# Patient Record
Sex: FEMALE | Race: WHITE | NOT HISPANIC OR LATINO | Employment: PART TIME | URBAN - NONMETROPOLITAN AREA
[De-identification: names, ages, dates, MRNs, and addresses within clinical notes are randomized per-mention and may not be internally consistent; named-entity substitution may affect disease eponyms.]

---

## 2017-06-29 ENCOUNTER — APPOINTMENT (OUTPATIENT)
Dept: GENERAL RADIOLOGY | Facility: HOSPITAL | Age: 22
End: 2017-06-29

## 2017-06-29 ENCOUNTER — HOSPITAL ENCOUNTER (EMERGENCY)
Facility: HOSPITAL | Age: 22
Discharge: HOME | End: 2017-06-29
Attending: EMERGENCY MEDICINE | Admitting: EMERGENCY MEDICINE

## 2017-06-29 VITALS
RESPIRATION RATE: 18 BRPM | SYSTOLIC BLOOD PRESSURE: 140 MMHG | HEART RATE: 87 BPM | HEIGHT: 66 IN | BODY MASS INDEX: 22.5 KG/M2 | WEIGHT: 140 LBS | TEMPERATURE: 98 F | DIASTOLIC BLOOD PRESSURE: 97 MMHG | OXYGEN SATURATION: 100 %

## 2017-06-29 DIAGNOSIS — R00.2 HEART PALPITATIONS: Primary | ICD-10-CM

## 2017-06-29 DIAGNOSIS — E16.2 HYPOGLYCEMIA: ICD-10-CM

## 2017-06-29 DIAGNOSIS — F45.8 HYPERVENTILATION SYNDROME: ICD-10-CM

## 2017-06-29 DIAGNOSIS — R42 LIGHTHEADEDNESS: ICD-10-CM

## 2017-06-29 LAB
AMPHET+METHAMPHET UR QL: NEGATIVE
AMPHETAMINES UR QL: NEGATIVE
ANION GAP SERPL CALCULATED.3IONS-SCNC: 15.9 MMOL/L
B-HCG UR QL: NEGATIVE
BACTERIA UR QL AUTO: NORMAL /HPF
BARBITURATES UR QL SCN: NEGATIVE
BASOPHILS # BLD AUTO: 0.06 10*3/MM3 (ref 0–0.2)
BASOPHILS NFR BLD AUTO: 1.2 % (ref 0–2.5)
BENZODIAZ UR QL SCN: NEGATIVE
BILIRUB UR QL STRIP: NEGATIVE
BUN BLD-MCNC: 13 MG/DL (ref 7–20)
BUN/CREAT SERPL: 11.8 (ref 7.1–23.5)
BUPRENORPHINE SERPL-MCNC: NEGATIVE NG/ML
CALCIUM SPEC-SCNC: 9.6 MG/DL (ref 8.4–10.2)
CANNABINOIDS SERPL QL: NEGATIVE
CHLORIDE SERPL-SCNC: 107 MMOL/L (ref 98–107)
CLARITY UR: CLEAR
CO2 SERPL-SCNC: 24 MMOL/L (ref 26–30)
COCAINE UR QL: NEGATIVE
COLOR UR: YELLOW
CREAT BLD-MCNC: 1.1 MG/DL (ref 0.6–1.3)
DEPRECATED RDW RBC AUTO: 42.1 FL (ref 37–54)
EOSINOPHIL # BLD AUTO: 0.22 10*3/MM3 (ref 0–0.7)
EOSINOPHIL NFR BLD AUTO: 4.3 % (ref 0–7)
ERYTHROCYTE [DISTWIDTH] IN BLOOD BY AUTOMATED COUNT: 12.5 % (ref 11.5–14.5)
GFR SERPL CREATININE-BSD FRML MDRD: 63 ML/MIN/1.73
GLUCOSE BLD-MCNC: 65 MG/DL (ref 74–98)
GLUCOSE BLDC GLUCOMTR-MCNC: 103 MG/DL (ref 70–130)
GLUCOSE UR STRIP-MCNC: NEGATIVE MG/DL
HCT VFR BLD AUTO: 45 % (ref 37–47)
HGB BLD-MCNC: 14.7 G/DL (ref 12–16)
HGB UR QL STRIP.AUTO: ABNORMAL
HYALINE CASTS UR QL AUTO: NORMAL /LPF
IMM GRANULOCYTES # BLD: 0.01 10*3/MM3 (ref 0–0.06)
IMM GRANULOCYTES NFR BLD: 0.2 % (ref 0–0.6)
KETONES UR QL STRIP: NEGATIVE
LEUKOCYTE ESTERASE UR QL STRIP.AUTO: NEGATIVE
LYMPHOCYTES # BLD AUTO: 1.45 10*3/MM3 (ref 0.6–3.4)
LYMPHOCYTES NFR BLD AUTO: 28.3 % (ref 10–50)
MCH RBC QN AUTO: 30 PG (ref 27–31)
MCHC RBC AUTO-ENTMCNC: 32.7 G/DL (ref 30–37)
MCV RBC AUTO: 91.8 FL (ref 81–99)
METHADONE UR QL SCN: NEGATIVE
MONOCYTES # BLD AUTO: 0.3 10*3/MM3 (ref 0–0.9)
MONOCYTES NFR BLD AUTO: 5.8 % (ref 0–12)
NEUTROPHILS # BLD AUTO: 3.09 10*3/MM3 (ref 2–6.9)
NEUTROPHILS NFR BLD AUTO: 60.2 % (ref 37–80)
NITRITE UR QL STRIP: NEGATIVE
NRBC BLD MANUAL-RTO: 0 /100 WBC (ref 0–0)
OPIATES UR QL: NEGATIVE
OXYCODONE UR QL SCN: NEGATIVE
PCP UR QL SCN: NEGATIVE
PH UR STRIP.AUTO: 5.5 [PH] (ref 5–8)
PLATELET # BLD AUTO: 293 10*3/MM3 (ref 130–400)
PMV BLD AUTO: 9.5 FL (ref 6–12)
POTASSIUM BLD-SCNC: 3.9 MMOL/L (ref 3.5–5.1)
PROPOXYPH UR QL: NEGATIVE
PROT UR QL STRIP: NEGATIVE
RBC # BLD AUTO: 4.9 10*6/MM3 (ref 4.2–5.4)
RBC # UR: NORMAL /HPF
REF LAB TEST METHOD: NORMAL
SODIUM BLD-SCNC: 143 MMOL/L (ref 137–145)
SP GR UR STRIP: 1.01 (ref 1–1.03)
SQUAMOUS #/AREA URNS HPF: NORMAL /HPF
TRICYCLICS UR QL SCN: NEGATIVE
UROBILINOGEN UR QL STRIP: ABNORMAL
WBC NRBC COR # BLD: 5.13 10*3/MM3 (ref 4.8–10.8)
WBC UR QL AUTO: NORMAL /HPF

## 2017-06-29 PROCEDURE — 80306 DRUG TEST PRSMV INSTRMNT: CPT | Performed by: PHYSICIAN ASSISTANT

## 2017-06-29 PROCEDURE — 80048 BASIC METABOLIC PNL TOTAL CA: CPT | Performed by: PHYSICIAN ASSISTANT

## 2017-06-29 PROCEDURE — 81001 URINALYSIS AUTO W/SCOPE: CPT | Performed by: PHYSICIAN ASSISTANT

## 2017-06-29 PROCEDURE — 80177 DRUG SCRN QUAN LEVETIRACETAM: CPT | Performed by: PHYSICIAN ASSISTANT

## 2017-06-29 PROCEDURE — 81025 URINE PREGNANCY TEST: CPT | Performed by: PHYSICIAN ASSISTANT

## 2017-06-29 PROCEDURE — 93005 ELECTROCARDIOGRAM TRACING: CPT | Performed by: PHYSICIAN ASSISTANT

## 2017-06-29 PROCEDURE — 99283 EMERGENCY DEPT VISIT LOW MDM: CPT

## 2017-06-29 PROCEDURE — 85025 COMPLETE CBC W/AUTO DIFF WBC: CPT | Performed by: PHYSICIAN ASSISTANT

## 2017-06-29 PROCEDURE — 82962 GLUCOSE BLOOD TEST: CPT

## 2017-06-29 PROCEDURE — 71010 HC CHEST PA OR AP: CPT

## 2017-06-29 RX ORDER — HYDROXYZINE PAMOATE 50 MG/1
50 CAPSULE ORAL ONCE
Status: DISCONTINUED | OUTPATIENT
Start: 2017-06-29 | End: 2017-06-29

## 2017-06-29 RX ORDER — BUSPIRONE HYDROCHLORIDE 5 MG/1
5 TABLET ORAL 2 TIMES DAILY PRN
Qty: 14 TABLET | Refills: 0 | Status: SHIPPED | OUTPATIENT
Start: 2017-06-29 | End: 2017-07-18

## 2017-06-29 RX ORDER — LORAZEPAM 0.5 MG/1
0.5 TABLET ORAL ONCE
Status: COMPLETED | OUTPATIENT
Start: 2017-06-29 | End: 2017-06-29

## 2017-06-29 RX ADMIN — LORAZEPAM 0.5 MG: 0.5 TABLET ORAL at 16:22

## 2017-07-03 LAB — LEVETIRACETAM SERPL-MCNC: 1.6 UG/ML (ref 10–40)

## 2017-07-18 ENCOUNTER — OFFICE VISIT (OUTPATIENT)
Dept: INTERNAL MEDICINE | Facility: CLINIC | Age: 22
End: 2017-07-18

## 2017-07-18 VITALS
DIASTOLIC BLOOD PRESSURE: 62 MMHG | BODY MASS INDEX: 22.02 KG/M2 | SYSTOLIC BLOOD PRESSURE: 108 MMHG | TEMPERATURE: 98.1 F | WEIGHT: 137 LBS | OXYGEN SATURATION: 97 % | HEART RATE: 54 BPM | HEIGHT: 66 IN

## 2017-07-18 DIAGNOSIS — G40.A09 NONINTRACTABLE ABSENCE EPILEPSY WITHOUT STATUS EPILEPTICUS (HCC): ICD-10-CM

## 2017-07-18 DIAGNOSIS — R00.2 PALPITATIONS: ICD-10-CM

## 2017-07-18 DIAGNOSIS — R63.4 UNEXPLAINED WEIGHT LOSS: ICD-10-CM

## 2017-07-18 DIAGNOSIS — R53.83 FATIGUE, UNSPECIFIED TYPE: ICD-10-CM

## 2017-07-18 DIAGNOSIS — F41.9 ANXIETY: ICD-10-CM

## 2017-07-18 DIAGNOSIS — E16.2 HYPOGLYCEMIA: Primary | ICD-10-CM

## 2017-07-18 PROCEDURE — 99204 OFFICE O/P NEW MOD 45 MIN: CPT | Performed by: FAMILY MEDICINE

## 2017-07-18 RX ORDER — BLOOD-GLUCOSE METER
1 KIT MISCELLANEOUS DAILY
Qty: 100 EACH | Refills: 12 | OUTPATIENT
Start: 2017-07-18 | End: 2020-08-13

## 2017-07-18 RX ORDER — BUSPIRONE HYDROCHLORIDE 5 MG/1
5 TABLET ORAL 2 TIMES DAILY PRN
Qty: 60 TABLET | Refills: 5 | Status: SHIPPED | OUTPATIENT
Start: 2017-07-18 | End: 2018-03-20

## 2017-07-18 RX ORDER — BLOOD-GLUCOSE METER
1 KIT MISCELLANEOUS AS NEEDED
Qty: 30 EACH | Refills: 0 | Status: SHIPPED | OUTPATIENT
Start: 2017-07-18

## 2017-07-18 RX ORDER — NORGESTIMATE AND ETHINYL ESTRADIOL 7DAYSX3 28
1 KIT ORAL DAILY
COMMUNITY
End: 2020-08-13

## 2017-07-18 NOTE — PROGRESS NOTES
"Subjective    Tali Benítez is a 21 y.o. female here for:  Chief Complaint   Patient presents with   • Establish Care     ESTABLISH CARE WITH DR LANZA   • Palpitations     ER F/U [facility] D/C 06/29; PALPITATIONS, LOW BLOOD SUGAR, AND ANXIETY     History of Present Illness   Patient woke up one morning and felt okay, says she was not stressed (admits to getting stressed easily, though.) She got up and got in the shower and hands, feet, face felt numb, buzzing. She figured it would pass. She went to eat at [employer] and had a lot of food with water (which is what she normally drinks.) She continued to feel worse, felt like talking was difficult. All of a sudden she felt a bit panicked. She finished eating then told her boyfriend her whole body felt like it was buzzing. In the car leaving she texted mom who asked if she was having an allergic reaction. She then had trouble breathing. In the ER she had labs that showed a low glucose level despite eating right before. She had another episode a week or so later where she was dizzy, drank some sweet tea then she felt better. Tends to eat healthy in general, exercises daily. Mom also has issues with sugar dropping. She does not feel she's losing weight but she does feel tired a lot. She sometimes has a tremor to hands at work. No medicine changes except for the birth control but that was after the ER episode. She sees a neurologist in [place], has not had a seizure in a long time. She admits she has a lot of issues with anxiety, mother has suggested medicine but she's not sure she wants to start another daily medicine. The ER gave her two weeks worth of Buspar, it did seem to help her when she took it. She was told to avoid benadryl, as she has \"an opposite reaction\". She says ER was going to prescribe something else like benadryl (likely hydroxyzine) but then changed to Buspar. She still gets palpitations at times when she's really anxious. She also notes at times fingers " have less color, she was told she may have raynauds. She does not smoke. She admits she does not eat certain carbs as she does not feel well after eating them but she's had her gallbladder out as well. She does not know of a personal or family history of celiac. Sister may have thyroid issues, mom does not.    The following portions of the patient's history were reviewed and updated as appropriate: allergies, current medications, past family history, past medical history, past social history, past surgical history and problem list.    Review of Systems   Constitutional: Positive for fatigue. Negative for unexpected weight change.   Respiratory: Positive for shortness of breath.    Cardiovascular: Positive for chest pain.   Gastrointestinal: Positive for abdominal pain. Negative for constipation and diarrhea.   Genitourinary: Positive for menstrual problem (abnormal vaginal bleeding, saw gynecology 7/13/17 and had pap) and pelvic pain.   Neurological: Positive for weakness.   Psychiatric/Behavioral: Positive for sleep disturbance. The patient is nervous/anxious.    All other systems reviewed and are negative.      Vitals:    07/18/17 1543   BP: 108/62   Pulse: 54   Temp: 98.1 °F (36.7 °C)   SpO2: 97%       Objective   Physical Exam   Constitutional: She is oriented to person, place, and time. Vital signs are normal. She appears well-developed and well-nourished. She is active.   HENT:   Head: Normocephalic and atraumatic. Hair is normal.   Right Ear: Hearing normal.   Left Ear: Hearing normal.   Nose: Nose normal.   Eyes: EOM and lids are normal. Pupils are equal, round, and reactive to light. No scleral icterus.   Neck: Phonation normal. Neck supple. No thyroid mass and no thyromegaly present.   Cardiovascular: Normal rate, regular rhythm and normal heart sounds.    Pulses:       Radial pulses are 2+ on the right side, and 2+ on the left side.   Pulmonary/Chest: Effort normal and breath sounds normal.   Abdominal:  Soft. Bowel sounds are normal. She exhibits no mass. There is tenderness (mild) in the suprapubic area. There is no rigidity, no rebound and no guarding.   Musculoskeletal: She exhibits no deformity.        Right hand: She exhibits no deformity.        Left hand: She exhibits no deformity.   Neurological: She is alert and oriented to person, place, and time. She has normal strength. She displays no tremor. No cranial nerve deficit. Gait normal.   Skin: Skin is warm. She is not diaphoretic. No cyanosis. No pallor. Nails show no clubbing.   Psychiatric: She has a normal mood and affect. Her behavior is normal. Judgment and thought content normal.   Nursing note and vitals reviewed.      Admission on 06/29/2017, Discharged on 06/29/2017   Component Date Value Ref Range Status   • Glucose 06/29/2017 65* 74 - 98 mg/dL Final   • BUN 06/29/2017 13  7 - 20 mg/dL Final   • Creatinine 06/29/2017 1.10  0.60 - 1.30 mg/dL Final   • Sodium 06/29/2017 143  137 - 145 mmol/L Final   • Potassium 06/29/2017 3.9  3.5 - 5.1 mmol/L Final   • Chloride 06/29/2017 107  98 - 107 mmol/L Final   • CO2 06/29/2017 24.0* 26.0 - 30.0 mmol/L Final   • Calcium 06/29/2017 9.6  8.4 - 10.2 mg/dL Final   • eGFR Non African Amer 06/29/2017 63  >60 mL/min/1.73 Final   • BUN/Creatinine Ratio 06/29/2017 11.8  7.1 - 23.5 Final   • Anion Gap 06/29/2017 15.9  mmol/L Final   • HCG, Urine QL 06/29/2017 Negative  Negative Final   • Color, UA 06/29/2017 Yellow  Yellow, Straw Final   • Appearance, UA 06/29/2017 Clear  Clear Final   • pH, UA 06/29/2017 5.5  5.0 - 8.0 Final   • Specific Gravity, UA 06/29/2017 1.010  1.005 - 1.030 Final   • Glucose, UA 06/29/2017 Negative  Negative Final   • Ketones, UA 06/29/2017 Negative  Negative Final   • Bilirubin, UA 06/29/2017 Negative  Negative Final   • Blood, UA 06/29/2017 Trace* Negative Final   • Protein, UA 06/29/2017 Negative  Negative Final   • Leuk Esterase, UA 06/29/2017 Negative  Negative Final   • Nitrite, UA  06/29/2017 Negative  Negative Final   • Urobilinogen, UA 06/29/2017 0.2 E.U./dL  0.2 - 1.0 E.U./dL Final   • Levetiracetam 06/29/2017 1.6* 10.0 - 40.0 ug/mL Final   • WBC 06/29/2017 5.13  4.80 - 10.80 10*3/mm3 Final   • RBC 06/29/2017 4.90  4.20 - 5.40 10*6/mm3 Final   • Hemoglobin 06/29/2017 14.7  12.0 - 16.0 g/dL Final   • Hematocrit 06/29/2017 45.0  37.0 - 47.0 % Final   • MCV 06/29/2017 91.8  81.0 - 99.0 fL Final   • MCH 06/29/2017 30.0  27.0 - 31.0 pg Final   • MCHC 06/29/2017 32.7  30.0 - 37.0 g/dL Final   • RDW 06/29/2017 12.5  11.5 - 14.5 % Final   • RDW-SD 06/29/2017 42.1  37.0 - 54.0 fl Final   • MPV 06/29/2017 9.5  6.0 - 12.0 fL Final   • Platelets 06/29/2017 293  130 - 400 10*3/mm3 Final   • Neutrophil % 06/29/2017 60.2  37.0 - 80.0 % Final   • Lymphocyte % 06/29/2017 28.3  10.0 - 50.0 % Final   • Monocyte % 06/29/2017 5.8  0.0 - 12.0 % Final   • Eosinophil % 06/29/2017 4.3  0.0 - 7.0 % Final   • Basophil % 06/29/2017 1.2  0.0 - 2.5 % Final   • Immature Grans % 06/29/2017 0.2  0.0 - 0.6 % Final   • Neutrophils, Absolute 06/29/2017 3.09  2.00 - 6.90 10*3/mm3 Final   • Lymphocytes, Absolute 06/29/2017 1.45  0.60 - 3.40 10*3/mm3 Final   • Monocytes, Absolute 06/29/2017 0.30  0.00 - 0.90 10*3/mm3 Final   • Eosinophils, Absolute 06/29/2017 0.22  0.00 - 0.70 10*3/mm3 Final   • Basophils, Absolute 06/29/2017 0.06  0.00 - 0.20 10*3/mm3 Final   • Immature Grans, Absolute 06/29/2017 0.01  0.00 - 0.06 10*3/mm3 Final   • nRBC 06/29/2017 0.0  0.0 - 0.0 /100 WBC Final   • THC, Screen, Urine 06/29/2017 Negative  Negative Final   • Phencyclidine (PCP), Urine 06/29/2017 Negative  Negative Final   • Cocaine Screen, Urine 06/29/2017 Negative  Negative Final   • Methamphetamine, Urine 06/29/2017 Negative  Negative Final   • Opiate Screen 06/29/2017 Negative  Negative Final   • Amphetamine Screen, Urine 06/29/2017 Negative  Negative Final   • Benzodiazepine Screen, Urine 06/29/2017 Negative  Negative Final   • Tricyclic  Antidepressants Screen 06/29/2017 Negative  Negative Final   • Methadone Screen, Urine 06/29/2017 Negative  Negative Final   • Barbiturates Screen, Urine 06/29/2017 Negative  Negative Final   • Oxycodone Screen, Urine 06/29/2017 Negative  Negative Final   • Propoxyphene Screen 06/29/2017 Negative  Negative Final   • Buprenorphine, Screen, Urine 06/29/2017 Negative  Negative Final   • RBC, UA 06/29/2017 None Seen  None Seen /HPF Final   • WBC, UA 06/29/2017 None Seen  None Seen /HPF Final   • Bacteria, UA 06/29/2017 None Seen  None Seen /HPF Final   • Squamous Epithelial Cells, UA 06/29/2017 0-2  None Seen, 0-2 /HPF Final   • Hyaline Casts, UA 06/29/2017 None Seen  None Seen /LPF Final   • Methodology 06/29/2017 Manual Light Microscopy   Final   • Glucose 06/29/2017 103  70 - 130 mg/dL Final    Serial Number: [id number]    : [id number]         Assessment/Plan   Tali was seen today for establish care and palpitations.    Diagnoses and all orders for this visit:    Hypoglycemia  Comments:  Meter prescribed, check glucose if symptoms return. May need hypoglycemia work up, more detailed labs.  Orders:  -     glucose monitor monitoring kit; 1 each As Needed (hypoglycemia).  -     B-D ULTRA-FINE 33 LANCETS misc; 1 Units Daily. Check sugars fasting and 2 hours after meals.  -     glucose blood test strip; Check sugars fasting and 2 hours after meals.  -     Comprehensive Metabolic Panel    Anxiety  Comments:  May continue Buspar as needed.  Orders:  -     busPIRone (BUSPAR) 5 MG tablet; Take 1 tablet by mouth 2 (Two) Times a Day As Needed (anxiety).    Fatigue, unspecified type  -     Comprehensive Metabolic Panel  -     TSH  -     T4, Free  -     Magnesium  -     Vitamin B12 & Folate    Unexplained weight loss  Comments:  She was not aware of weight loss trend since April.  Orders:  -     TSH  -     T4, Free    Palpitations  -     TSH  -     T4, Free  -     Magnesium    Nonintractable absence epilepsy without status  epilepticus  Comments:  Follow up with neurologist. If she needs a local provider in the future I asked her to let us know and I'll refer.    May need work up for celiac in the future.           Lissette Borja MD

## 2017-07-19 LAB
ALBUMIN SERPL-MCNC: 4.2 G/DL (ref 3.5–5)
ALBUMIN/GLOB SERPL: 1.6 G/DL (ref 1–2)
ALP SERPL-CCNC: 62 U/L (ref 38–126)
ALT SERPL-CCNC: 19 U/L (ref 13–69)
AST SERPL-CCNC: 24 U/L (ref 15–46)
BILIRUB SERPL-MCNC: 0.5 MG/DL (ref 0.2–1.3)
BUN SERPL-MCNC: 17 MG/DL (ref 7–20)
BUN/CREAT SERPL: 17 (ref 7.1–23.5)
CALCIUM SERPL-MCNC: 9.8 MG/DL (ref 8.4–10.2)
CHLORIDE SERPL-SCNC: 105 MMOL/L (ref 98–107)
CO2 SERPL-SCNC: 23 MMOL/L (ref 26–30)
CREAT SERPL-MCNC: 1 MG/DL (ref 0.6–1.3)
FOLATE SERPL-MCNC: >20 NG/ML
GLOBULIN SER CALC-MCNC: 2.7 GM/DL
GLUCOSE SERPL-MCNC: 92 MG/DL (ref 74–98)
MAGNESIUM SERPL-MCNC: 1.9 MG/DL (ref 1.6–2.3)
POTASSIUM SERPL-SCNC: 4.4 MMOL/L (ref 3.5–5.1)
PROT SERPL-MCNC: 6.9 G/DL (ref 6.3–8.2)
SODIUM SERPL-SCNC: 139 MMOL/L (ref 137–145)
T4 FREE SERPL-MCNC: 0.96 NG/DL (ref 0.78–2.19)
TSH SERPL DL<=0.005 MIU/L-ACNC: 1.05 MIU/ML (ref 0.47–4.68)
VIT B12 SERPL-MCNC: 571 PG/ML (ref 239–931)

## 2018-05-10 ENCOUNTER — APPOINTMENT (OUTPATIENT)
Dept: CT IMAGING | Facility: HOSPITAL | Age: 23
End: 2018-05-10

## 2018-05-10 ENCOUNTER — HOSPITAL ENCOUNTER (EMERGENCY)
Facility: HOSPITAL | Age: 23
Discharge: HOME | End: 2018-05-10
Attending: EMERGENCY MEDICINE | Admitting: EMERGENCY MEDICINE

## 2018-05-10 VITALS
BODY MASS INDEX: 22.49 KG/M2 | WEIGHT: 135 LBS | TEMPERATURE: 98.7 F | DIASTOLIC BLOOD PRESSURE: 73 MMHG | HEIGHT: 65 IN | HEART RATE: 79 BPM | RESPIRATION RATE: 18 BRPM | SYSTOLIC BLOOD PRESSURE: 123 MMHG | OXYGEN SATURATION: 99 %

## 2018-05-10 DIAGNOSIS — R82.71 BACTERIURIA: ICD-10-CM

## 2018-05-10 DIAGNOSIS — R56.9 SEIZURE (HCC): Primary | ICD-10-CM

## 2018-05-10 LAB
ALBUMIN SERPL-MCNC: 4.7 G/DL (ref 3.5–5)
ALBUMIN/GLOB SERPL: 1.6 G/DL (ref 1–2)
ALP SERPL-CCNC: 63 U/L (ref 38–126)
ALT SERPL W P-5'-P-CCNC: 24 U/L (ref 13–69)
ANION GAP SERPL CALCULATED.3IONS-SCNC: 20.4 MMOL/L (ref 10–20)
AST SERPL-CCNC: 46 U/L (ref 15–46)
B-HCG UR QL: NEGATIVE
BACTERIA UR QL AUTO: ABNORMAL /HPF
BASOPHILS # BLD AUTO: 0.05 10*3/MM3 (ref 0–0.2)
BASOPHILS NFR BLD AUTO: 0.7 % (ref 0–2.5)
BILIRUB SERPL-MCNC: 0.5 MG/DL (ref 0.2–1.3)
BILIRUB UR QL STRIP: NEGATIVE
BUN BLD-MCNC: 14 MG/DL (ref 7–20)
BUN/CREAT SERPL: 17.5 (ref 7.1–23.5)
CALCIUM SPEC-SCNC: 9.4 MG/DL (ref 8.4–10.2)
CHLORIDE SERPL-SCNC: 97 MMOL/L (ref 98–107)
CLARITY UR: CLEAR
CO2 SERPL-SCNC: 21 MMOL/L (ref 26–30)
COLOR UR: YELLOW
CREAT BLD-MCNC: 0.8 MG/DL (ref 0.6–1.3)
DEPRECATED RDW RBC AUTO: 41.1 FL (ref 37–54)
EOSINOPHIL # BLD AUTO: 0.03 10*3/MM3 (ref 0–0.7)
EOSINOPHIL NFR BLD AUTO: 0.4 % (ref 0–7)
ERYTHROCYTE [DISTWIDTH] IN BLOOD BY AUTOMATED COUNT: 12.4 % (ref 11.5–14.5)
GFR SERPL CREATININE-BSD FRML MDRD: 90 ML/MIN/1.73
GLOBULIN UR ELPH-MCNC: 2.9 GM/DL
GLUCOSE BLD-MCNC: 71 MG/DL (ref 74–98)
GLUCOSE UR STRIP-MCNC: NEGATIVE MG/DL
HCT VFR BLD AUTO: 39.2 % (ref 37–47)
HGB BLD-MCNC: 13.1 G/DL (ref 12–16)
HGB UR QL STRIP.AUTO: ABNORMAL
HOLD SPECIMEN: NORMAL
HYALINE CASTS UR QL AUTO: ABNORMAL /LPF
IMM GRANULOCYTES # BLD: 0.02 10*3/MM3 (ref 0–0.06)
IMM GRANULOCYTES NFR BLD: 0.3 % (ref 0–0.6)
KETONES UR QL STRIP: NEGATIVE
LEUKOCYTE ESTERASE UR QL STRIP.AUTO: NEGATIVE
LYMPHOCYTES # BLD AUTO: 1.2 10*3/MM3 (ref 0.6–3.4)
LYMPHOCYTES NFR BLD AUTO: 16.6 % (ref 10–50)
MCH RBC QN AUTO: 30.5 PG (ref 27–31)
MCHC RBC AUTO-ENTMCNC: 33.4 G/DL (ref 30–37)
MCV RBC AUTO: 91.4 FL (ref 81–99)
MONOCYTES # BLD AUTO: 0.34 10*3/MM3 (ref 0–0.9)
MONOCYTES NFR BLD AUTO: 4.7 % (ref 0–12)
NEUTROPHILS # BLD AUTO: 5.59 10*3/MM3 (ref 2–6.9)
NEUTROPHILS NFR BLD AUTO: 77.3 % (ref 37–80)
NITRITE UR QL STRIP: NEGATIVE
NRBC BLD MANUAL-RTO: 0 /100 WBC (ref 0–0)
PH UR STRIP.AUTO: <=5 [PH] (ref 5–8)
PLATELET # BLD AUTO: 245 10*3/MM3 (ref 130–400)
PMV BLD AUTO: 9.3 FL (ref 6–12)
POTASSIUM BLD-SCNC: 3.4 MMOL/L (ref 3.5–5.1)
PROT SERPL-MCNC: 7.6 G/DL (ref 6.3–8.2)
PROT UR QL STRIP: NEGATIVE
RBC # BLD AUTO: 4.29 10*6/MM3 (ref 4.2–5.4)
RBC # UR: ABNORMAL /HPF
REF LAB TEST METHOD: ABNORMAL
SODIUM BLD-SCNC: 135 MMOL/L (ref 137–145)
SP GR UR STRIP: 1.01 (ref 1–1.03)
SQUAMOUS #/AREA URNS HPF: ABNORMAL /HPF
TRANS CELLS #/AREA URNS HPF: ABNORMAL /HPF
UROBILINOGEN UR QL STRIP: ABNORMAL
WBC NRBC COR # BLD: 7.23 10*3/MM3 (ref 4.8–10.8)
WBC UR QL AUTO: ABNORMAL /HPF
WHOLE BLOOD HOLD SPECIMEN: NORMAL

## 2018-05-10 PROCEDURE — 93005 ELECTROCARDIOGRAM TRACING: CPT | Performed by: EMERGENCY MEDICINE

## 2018-05-10 PROCEDURE — 99284 EMERGENCY DEPT VISIT MOD MDM: CPT

## 2018-05-10 PROCEDURE — 85025 COMPLETE CBC W/AUTO DIFF WBC: CPT | Performed by: EMERGENCY MEDICINE

## 2018-05-10 PROCEDURE — 87086 URINE CULTURE/COLONY COUNT: CPT | Performed by: EMERGENCY MEDICINE

## 2018-05-10 PROCEDURE — 81025 URINE PREGNANCY TEST: CPT | Performed by: EMERGENCY MEDICINE

## 2018-05-10 PROCEDURE — 70450 CT HEAD/BRAIN W/O DYE: CPT

## 2018-05-10 PROCEDURE — 81001 URINALYSIS AUTO W/SCOPE: CPT | Performed by: EMERGENCY MEDICINE

## 2018-05-10 PROCEDURE — 80053 COMPREHEN METABOLIC PANEL: CPT | Performed by: EMERGENCY MEDICINE

## 2018-05-10 RX ORDER — CEPHALEXIN 250 MG/1
500 CAPSULE ORAL ONCE
Status: COMPLETED | OUTPATIENT
Start: 2018-05-10 | End: 2018-05-10

## 2018-05-10 RX ORDER — CEPHALEXIN 500 MG/1
500 CAPSULE ORAL 2 TIMES DAILY
Qty: 14 CAPSULE | Refills: 0 | Status: SHIPPED | OUTPATIENT
Start: 2018-05-10 | End: 2018-05-17

## 2018-05-10 RX ORDER — SODIUM CHLORIDE 0.9 % (FLUSH) 0.9 %
10 SYRINGE (ML) INJECTION AS NEEDED
Status: DISCONTINUED | OUTPATIENT
Start: 2018-05-10 | End: 2018-05-10 | Stop reason: HOSPADM

## 2018-05-10 RX ADMIN — CEPHALEXIN 500 MG: 250 CAPSULE ORAL at 01:45

## 2018-05-10 NOTE — ED PROVIDER NOTES
Subjective   History of Present Illness   22-year-old female with a history of epilepsy with near daily seizures despite Keppra presenting via EMS after seizure.  Per report, patient was working out when she had a focal seizure that then progressed to a tonic-clonic seizure lasting about 45 seconds.  Patient does not remember this and EMS were not still in the room when I went to interview the patient.  She states that she is slightly confused at this time denies any head trauma, fevers, chills, or other infectious symptoms recently.  Denies missing any of her medications though she does not remember at this time what the name her medication is.    Review of Systems   Neurological: Positive for seizures.   All other systems reviewed and are negative.      Past Medical History:   Diagnosis Date   • Asthma    • Bilateral ovarian cysts    • Epilepsy    • GERD (gastroesophageal reflux disease)    • Migraines    • Seizures     absence. Sees a neurologist in [place] where she's from.       No Known Allergies    Past Surgical History:   Procedure Laterality Date   • CHOLECYSTECTOMY  2007       Family History   Problem Relation Age of Onset   • Heart disease Maternal Grandfather    • Heart disease Paternal Grandfather    • Diabetes Paternal Grandfather    • Hypertension Paternal Grandfather    • Hyperlipidemia Paternal Grandfather    • Migraines Paternal Grandfather    • Migraines Mother    • Obesity Father    • Breast cancer Maternal Grandmother    • Breast cancer Paternal Grandmother    • Migraines Paternal Grandmother        Social History     Social History   • Marital status: Single     Social History Main Topics   • Smoking status: Never Smoker   • Smokeless tobacco: Never Used   • Alcohol use No   • Drug use: No   • Sexual activity: Defer     Other Topics Concern   • Not on file           Objective   Physical Exam   Constitutional: She is oriented to person, place, and time. She appears well-developed and  well-nourished. No distress.   HENT:   Head: Normocephalic and atraumatic.   Mouth/Throat: Oropharynx is clear and moist. No oropharyngeal exudate.   Eyes: EOM are normal. No scleral icterus.   Neck: Neck supple. No tracheal deviation present.   Cardiovascular: Normal rate, regular rhythm, normal heart sounds and intact distal pulses.  Exam reveals no gallop and no friction rub.    No murmur heard.  Pulmonary/Chest: Effort normal and breath sounds normal. No stridor. No respiratory distress. She has no wheezes. She has no rales. She exhibits no tenderness.   Abdominal: Soft. She exhibits no distension and no mass. There is no tenderness. There is no rebound and no guarding. No hernia.   Musculoskeletal: She exhibits no edema or deformity.   Neurological: She is alert and oriented to person, place, and time. No cranial nerve deficit.   Skin: Skin is warm and dry. She is not diaphoretic. No erythema. No pallor.   Psychiatric: She has a normal mood and affect. Her behavior is normal.   Nursing note and vitals reviewed.      Procedures           ED Course  ED Course                  MDM  22-year-old female here with breakthrough seizure.  Afebrile, vital signs are all for mild tachycardia.  No focal signs suggestive of infection or head trauma on exam.  We'll send lab work, monitor further, and reassess.    1:00 AM patient is less confused now and she also has collateral from her boyfriend.  He is in that she does not have any history of tonic-clonic seizures in the past, so this is a new type of seizure for them.  Given this, I discussed the risks and benefits of getting a CT scan of the head given my low suspicion for finding anything but the fact that they have a new type of seizure.  She preferred to have this done at this time.  So a CT scan of the head is ordered, labs are in process, and she is clearing mentally. Boyfriend believes she missed a dose of her Keppra. Anticipate that if her workup is reassuring, will  discharge home with information to follow-up with Dr. Stearns.    1:43 AM Labs show e/o bacteriuria, so will treat with keflex. CT head unremarkable. Pt back to baseline. Will d/c home with f/u information with Dr. Stearns. Discussed return to care precautions.     Final diagnoses:   Seizure   Bacteriuria            Chito Edwards MD  05/10/18 0144

## 2018-05-11 LAB — BACTERIA SPEC AEROBE CULT: NO GROWTH

## 2018-05-29 ENCOUNTER — OFFICE VISIT (OUTPATIENT)
Dept: INTERNAL MEDICINE | Facility: CLINIC | Age: 23
End: 2018-05-29

## 2018-05-29 VITALS
WEIGHT: 142 LBS | DIASTOLIC BLOOD PRESSURE: 64 MMHG | SYSTOLIC BLOOD PRESSURE: 98 MMHG | RESPIRATION RATE: 14 BRPM | OXYGEN SATURATION: 98 % | TEMPERATURE: 98 F | HEIGHT: 65 IN | HEART RATE: 64 BPM | BODY MASS INDEX: 23.66 KG/M2

## 2018-05-29 DIAGNOSIS — R07.89 CHEST WALL PAIN: Primary | ICD-10-CM

## 2018-05-29 DIAGNOSIS — G40.A09 NONINTRACTABLE ABSENCE EPILEPSY WITHOUT STATUS EPILEPTICUS (HCC): ICD-10-CM

## 2018-05-29 DIAGNOSIS — M62.838 MUSCLE SPASM: ICD-10-CM

## 2018-05-29 DIAGNOSIS — J45.20 MILD INTERMITTENT ASTHMA WITHOUT COMPLICATION: ICD-10-CM

## 2018-05-29 PROCEDURE — 99214 OFFICE O/P EST MOD 30 MIN: CPT | Performed by: FAMILY MEDICINE

## 2018-05-29 PROCEDURE — 93000 ELECTROCARDIOGRAM COMPLETE: CPT | Performed by: FAMILY MEDICINE

## 2018-05-29 RX ORDER — LAMOTRIGINE 100 MG/1
200 TABLET ORAL 2 TIMES DAILY
COMMUNITY

## 2018-05-29 NOTE — PROGRESS NOTES
"Subjective    Tali Benítez is a 22 y.o. female here for:  Chief Complaint   Patient presents with   • Muscle Pain     Patient states she has been having pain in chest off and on that feels like a pulled muscle     History of Present Illness     Pain in chest for 2 months. Pain is not reproducible to her touch but when she breathes she feels it near left shoulder, sternum. She also has a pain in back that is reproducible. No injuries. She always feels short of breath due to anxiety, no change. She's been without her inhaler. She notes trying to increase her bench press. It really hurts when she does lat pull downs. No leg swelling. On oral contraceptive pill, no history of clots. No change in pain with position. She has asthma, admits she's run out of her inhaler.    Recently seen in ER for seizure. Has followed up with neurology and has some tests ordered. Taking medicines as prescribed.    The following portions of the patient's history were reviewed and updated as appropriate: allergies, current medications, past family history, past medical history, past social history, past surgical history and problem list.    Review of Systems   Respiratory: Positive for shortness of breath.    Neurological: Positive for seizures.   Psychiatric/Behavioral: The patient is nervous/anxious.        Vitals:    05/29/18 1542   BP: 98/64   Pulse: 64   Resp: 14   Temp: 98 °F (36.7 °C)   SpO2: 98%   Weight: 64.4 kg (142 lb)   Height: 165.1 cm (65\")         Objective   Physical Exam   Constitutional: She is oriented to person, place, and time. Vital signs are normal. She appears well-developed and well-nourished. She is active.  Non-toxic appearance. She does not appear ill. No distress. She is not overweight.  HENT:   Head: Normocephalic and atraumatic. Hair is normal.   Right Ear: Hearing and external ear normal.   Left Ear: Hearing and external ear normal.   Nose: Nose normal.   Mouth/Throat: Mucous membranes are not dry.   Eyes: " EOM and lids are normal. Pupils are equal, round, and reactive to light. No scleral icterus.   Neck: Neck supple.   Cardiovascular: Regular rhythm and normal heart sounds.  Bradycardia present.    No murmur heard.  Pulmonary/Chest: Effort normal and breath sounds normal.         She exhibits no mass, no tenderness, no crepitus, no deformity and no retraction.   Musculoskeletal: She exhibits no edema or deformity.   Neurological: She is alert and oriented to person, place, and time. She displays no tremor. No cranial nerve deficit. Gait normal.   Skin: Skin is warm and dry. No rash noted. She is not diaphoretic. No cyanosis. Nails show no clubbing.   Psychiatric: She has a normal mood and affect. Her speech is normal and behavior is normal. Judgment and thought content normal. Cognition and memory are normal.   Nursing note and vitals reviewed.        ECG 12 Lead  Date/Time: 5/29/2018 4:05 PM  Performed by: JANNIE LANZA  Authorized by: JANNIE LANZA   Comparison: not compared with previous ECG   Rhythm: sinus bradycardia  Rate: bradycardic  BPM: 55  Conduction comments: rSr' V1  ST Segments: ST segments normal  T Waves: T waves normal  QRS axis: normal  Other: no other findings  Clinical impression: non-specific ECG          Reviewed ER note from 5/10/18. CT head was unremarkable. Mild decrease on sodium, potassium, chloride upon lab review but patient notes she had been exercising prior to ER visit.    Assessment/Plan     Problem List Items Addressed This Visit        Respiratory    Mild intermittent asthma without complication    Relevant Medications    albuterol (PROAIR RESPICLICK) 108 (90 Base) MCG/ACT inhaler       Nervous and Auditory    Nonintractable absence epilepsy without status epilepticus    Current Assessment & Plan     Follow up with neurology as scheduled and continue medicines as prescribed by specialist.         Relevant Medications    lamoTRIgine (LaMICtal) 25 MG tablet      Other  Visit Diagnoses     Chest wall pain    -  Primary    Relevant Orders    ECG 12 Lead    Muscle spasm                · Discussed use of ibuprofen 400 mg bid with food for a week to decrease inflammation. Decrease chest exercises for now. Stay hydrated. Massage may help with spasm    Return for As Needed, If not improved.    Lissette Borja MD    Please note that portions of this note may have been completed with a voice recognition program. Efforts were made to edit dictation, but occasionally words are mistranscribed.

## 2018-05-29 NOTE — PATIENT INSTRUCTIONS
Ibuprofen 400 mg twice a day with food for a week to calm down pain/inflammation. Likely musculoskeletal pain. Low likelihood of pleurisy, pulmonary embolism, pericarditis.

## 2018-09-18 ENCOUNTER — HOSPITAL ENCOUNTER (EMERGENCY)
Facility: HOSPITAL | Age: 23
Discharge: HOME | End: 2018-09-18
Attending: EMERGENCY MEDICINE | Admitting: EMERGENCY MEDICINE

## 2018-09-18 VITALS
HEIGHT: 66 IN | RESPIRATION RATE: 16 BRPM | SYSTOLIC BLOOD PRESSURE: 113 MMHG | BODY MASS INDEX: 24.11 KG/M2 | HEART RATE: 75 BPM | WEIGHT: 150 LBS | TEMPERATURE: 98 F | DIASTOLIC BLOOD PRESSURE: 70 MMHG | OXYGEN SATURATION: 97 %

## 2018-09-18 DIAGNOSIS — R56.9 SEIZURES (HCC): Primary | ICD-10-CM

## 2018-09-18 PROCEDURE — 99283 EMERGENCY DEPT VISIT LOW MDM: CPT

## 2018-09-18 RX ORDER — LEVETIRACETAM 500 MG/1
750 TABLET ORAL ONCE
Status: COMPLETED | OUTPATIENT
Start: 2018-09-18 | End: 2018-09-18

## 2018-09-18 RX ORDER — LEVETIRACETAM 750 MG/1
750 TABLET ORAL DAILY
Qty: 14 TABLET | Refills: 0 | Status: SHIPPED | OUTPATIENT
Start: 2018-09-18

## 2018-09-18 RX ADMIN — LEVETIRACETAM 750 MG: 500 TABLET, FILM COATED ORAL at 21:32

## 2018-09-19 NOTE — ED PROVIDER NOTES
Subjective   History of Present Illness   22F w/ hx of seizures p/w breakthrough seizures. States that her insurance company did not send her mail in [facility] this month, so she has not had this for 3 days. In this setting, she has had more episodes of her absence seizures. Denies any other new symptoms or concerns.  She called her doctor and she is not able to get in for a repeat visit until November.    Review of Systems   Neurological: Positive for seizures.   All other systems reviewed and are negative.      Past Medical History:   Diagnosis Date   • Asthma    • Bilateral ovarian cysts    • Epilepsy (CMS/HCC)    • GERD (gastroesophageal reflux disease)    • Migraines    • Seizures (CMS/HCC)     absence. Sees a neurologist in [place] where she's from.       No Known Allergies    Past Surgical History:   Procedure Laterality Date   • CHOLECYSTECTOMY  2007       Family History   Problem Relation Age of Onset   • Heart disease Maternal Grandfather    • Heart disease Paternal Grandfather    • Diabetes Paternal Grandfather    • Hypertension Paternal Grandfather    • Hyperlipidemia Paternal Grandfather    • Migraines Paternal Grandfather    • Migraines Mother    • Obesity Father    • Breast cancer Maternal Grandmother    • Breast cancer Paternal Grandmother    • Migraines Paternal Grandmother        Social History     Social History   • Marital status: Single     Social History Main Topics   • Smoking status: Never Smoker   • Smokeless tobacco: Never Used   • Alcohol use No   • Drug use: No   • Sexual activity: Defer     Other Topics Concern   • Not on file           Objective   Physical Exam   Constitutional: She is oriented to person, place, and time. She appears well-developed and well-nourished. No distress.   HENT:   Head: Normocephalic.   Mouth/Throat: Oropharynx is clear and moist.   Eyes: No scleral icterus.   Neck: Neck supple. No tracheal deviation present.   Cardiovascular: Normal rate, regular rhythm, normal  heart sounds and intact distal pulses.  Exam reveals no gallop and no friction rub.    No murmur heard.  Pulmonary/Chest: Effort normal and breath sounds normal. No stridor. No respiratory distress. She has no wheezes. She has no rales.   Abdominal: Soft. She exhibits no distension and no mass. There is no tenderness. There is no rebound and no guarding.   Musculoskeletal: She exhibits no edema or deformity.   Neurological: She is alert and oriented to person, place, and time.   Skin: Skin is warm and dry. She is not diaphoretic. No erythema. No pallor.   Psychiatric: She has a normal mood and affect. Her behavior is normal.   Nursing note and vitals reviewed.      Procedures           ED Course                  MDM  22F who is been out of Keppra for 3 days and is now having increasing seizures.  Afebrile, vital signs stable.  Suspect that she is likely having her breakthrough Seizures Because She Has Not Had Antiepileptics for 3 Days.  Will Give a Dose Here and Sent Home with a 2 Week Supply of keppra.  Discussed Importance of Strict Return to Care Precautions Were    Final diagnoses:   Seizures (CMS/[facility])            Chito Edwards MD  09/18/18 [id number]

## 2020-02-01 ENCOUNTER — APPOINTMENT (OUTPATIENT)
Dept: CT IMAGING | Facility: HOSPITAL | Age: 25
End: 2020-02-01

## 2020-02-01 ENCOUNTER — HOSPITAL ENCOUNTER (EMERGENCY)
Facility: HOSPITAL | Age: 25
Discharge: HOME | End: 2020-02-01
Attending: EMERGENCY MEDICINE | Admitting: EMERGENCY MEDICINE

## 2020-02-01 VITALS
HEIGHT: 66 IN | RESPIRATION RATE: 16 BRPM | OXYGEN SATURATION: 99 % | HEART RATE: 68 BPM | SYSTOLIC BLOOD PRESSURE: 121 MMHG | TEMPERATURE: 97.9 F | BODY MASS INDEX: 25.81 KG/M2 | WEIGHT: 160.6 LBS | DIASTOLIC BLOOD PRESSURE: 88 MMHG

## 2020-02-01 DIAGNOSIS — G40.909 SEIZURE DISORDER (HCC): ICD-10-CM

## 2020-02-01 DIAGNOSIS — G43.909 MIGRAINE WITHOUT STATUS MIGRAINOSUS, NOT INTRACTABLE, UNSPECIFIED MIGRAINE TYPE: Primary | ICD-10-CM

## 2020-02-01 LAB — B-HCG UR QL: NEGATIVE

## 2020-02-01 PROCEDURE — 81025 URINE PREGNANCY TEST: CPT | Performed by: NURSE PRACTITIONER

## 2020-02-01 PROCEDURE — 99283 EMERGENCY DEPT VISIT LOW MDM: CPT

## 2020-02-01 PROCEDURE — 25010000002 PROCHLORPERAZINE 10 MG/2ML SOLUTION: Performed by: NURSE PRACTITIONER

## 2020-02-01 PROCEDURE — 96372 THER/PROPH/DIAG INJ SC/IM: CPT

## 2020-02-01 PROCEDURE — 70450 CT HEAD/BRAIN W/O DYE: CPT

## 2020-02-01 RX ORDER — PROCHLORPERAZINE EDISYLATE 5 MG/ML
10 INJECTION INTRAMUSCULAR; INTRAVENOUS ONCE
Status: COMPLETED | OUTPATIENT
Start: 2020-02-01 | End: 2020-02-01

## 2020-02-01 RX ORDER — ALBUTEROL SULFATE 90 UG/1
2 AEROSOL, METERED RESPIRATORY (INHALATION) EVERY 4 HOURS PRN
COMMUNITY
End: 2020-02-11 | Stop reason: SDUPTHER

## 2020-02-01 RX ADMIN — PROCHLORPERAZINE EDISYLATE 10 MG: 5 INJECTION INTRAMUSCULAR; INTRAVENOUS at 14:34

## 2020-02-01 NOTE — DISCHARGE INSTRUCTIONS
Do not try to drive or operate equipment today.  The medication given to you in the emergency department may make you sleepy.  Plenty of fluids and rest.

## 2020-02-02 NOTE — ED PROVIDER NOTES
Subjective   History of Present Illness  23 yo female presents with complaints of a significant right sided headache and left facial tingling. She has a history of seizures and over the last few days she has had more absent seizures than usually. She usually has them every few months. Today she had to go to a meeting in [place] and started having the headache and feels nauseated. No vomiting. No change in vision. Denies fever or chills. No head injury.  Review of Systems   All other systems reviewed and are negative.      Past Medical History:   Diagnosis Date   • Asthma    • Bilateral ovarian cysts    • Epilepsy (CMS/HCC)    • GERD (gastroesophageal reflux disease)    • Migraines    • Seizures (CMS/HCC)     absence. Sees a neurologist in [place] where she's from.       No Known Allergies    Past Surgical History:   Procedure Laterality Date   • CHOLECYSTECTOMY  2007       Family History   Problem Relation Age of Onset   • Heart disease Maternal Grandfather    • Heart disease Paternal Grandfather    • Diabetes Paternal Grandfather    • Hypertension Paternal Grandfather    • Hyperlipidemia Paternal Grandfather    • Migraines Paternal Grandfather    • Migraines Mother    • Obesity Father    • Breast cancer Maternal Grandmother    • Breast cancer Paternal Grandmother    • Migraines Paternal Grandmother        Social History     Socioeconomic History   • Marital status: Single     Spouse name: Not on file   • Number of children: Not on file   • Years of education: Not on file   • Highest education level: Not on file   Tobacco Use   • Smoking status: Never Smoker   • Smokeless tobacco: Never Used   Substance and Sexual Activity   • Alcohol use: No   • Drug use: No   • Sexual activity: Defer     Birth control/protection: OCP           Objective   Physical Exam   Constitutional: She is oriented to person, place, and time. She appears well-developed and well-nourished.   HENT:   Head: Normocephalic and atraumatic.    Nose: Nose normal.   Mouth/Throat: Oropharynx is clear and moist.   Eyes: Pupils are equal, round, and reactive to light. Conjunctivae and EOM are normal.   Neck: Normal range of motion. Neck supple.   Cardiovascular: Normal rate, regular rhythm, normal heart sounds and intact distal pulses.   Pulmonary/Chest: Effort normal and breath sounds normal.   Musculoskeletal: Normal range of motion.   Neurological: She is alert and oriented to person, place, and time.   Skin: Skin is warm and dry. Capillary refill takes less than 2 seconds.   Psychiatric: She has a normal mood and affect. Her behavior is normal. Judgment and thought content normal.   Nursing note and vitals reviewed.      Procedures           ED Course  ED Course as of Feb 02 1447   Sun Feb 02, 2020   1446 HISTORY: headache and facial numbness     COMPARISON: 05/10/2018     TECHNIQUE: Multiple axial CT images were performed from the foramen  magnum to the vertex without contrast. Coronal reformatted images were  also obtained.This study was performed with techniques to keep radiation  doses as low as reasonably achievable, (ALARA). Individualized dose  reduction techniques using automated exposure control or adjustment of  mA and/or kV according to the patient size were employed.        FINDINGS: The ventricles are normal in size. There is no evidence of  hemorrhage .  No masses are identified.  No abnormal extra-axial fluid  collection is seen.  There is no evidence of shift of the midline  structures. Images of the sinuses reveal no evidence of mucosal  thickening. No bony abnormality is seen on the bone window images.     IMPRESSION:  No acute intracranial abnormality.              This report was finalized on 2/1/2020 3:24 PM by Yonis Fishman MD.    [CM]   1446 Patient was given Compazine 10 mg IM with complete resolution of her headache and facial tingling. CT of the head is negative. I believe this was a migraine. She will contact her Neurologist  on Monday regarding her increased seizure activity.    [CM]      ED Course User Index  [CM] Abigail Stallworth APRN                                               [facility]    Final diagnoses:   Migraine without status migrainosus, not intractable, unspecified migraine type   Seizure disorder (CMS/HCC)            Abigail Stallworth APRN  02/02/20 1441

## 2020-08-13 PROBLEM — J02.9 SORE THROAT: Status: ACTIVE | Noted: 2020-08-13

## 2020-08-13 PROBLEM — R09.81 SINUS CONGESTION: Status: ACTIVE | Noted: 2020-08-13

## 2020-08-13 PROBLEM — R06.02 SHORTNESS OF BREATH: Status: ACTIVE | Noted: 2020-08-13

## 2020-08-13 PROCEDURE — U0003 INFECTIOUS AGENT DETECTION BY NUCLEIC ACID (DNA OR RNA); SEVERE ACUTE RESPIRATORY SYNDROME CORONAVIRUS 2 (SARS-COV-2) (CORONAVIRUS DISEASE [COVID-19]), AMPLIFIED PROBE TECHNIQUE, MAKING USE OF HIGH THROUGHPUT TECHNOLOGIES AS DESCRIBED BY CMS-2020-01-R: HCPCS | Performed by: NURSE PRACTITIONER

## 2020-08-14 ENCOUNTER — TELEPHONE (OUTPATIENT)
Dept: URGENT CARE | Facility: CLINIC | Age: 25
End: 2020-08-14

## 2020-11-25 PROCEDURE — U0003 INFECTIOUS AGENT DETECTION BY NUCLEIC ACID (DNA OR RNA); SEVERE ACUTE RESPIRATORY SYNDROME CORONAVIRUS 2 (SARS-COV-2) (CORONAVIRUS DISEASE [COVID-19]), AMPLIFIED PROBE TECHNIQUE, MAKING USE OF HIGH THROUGHPUT TECHNOLOGIES AS DESCRIBED BY CMS-2020-01-R: HCPCS | Performed by: FAMILY MEDICINE

## 2021-06-14 ENCOUNTER — APPOINTMENT (OUTPATIENT)
Dept: GENERAL RADIOLOGY | Facility: HOSPITAL | Age: 26
End: 2021-06-14

## 2021-06-14 ENCOUNTER — HOSPITAL ENCOUNTER (EMERGENCY)
Facility: HOSPITAL | Age: 26
Discharge: HOME | End: 2021-06-15
Admitting: EMERGENCY MEDICINE

## 2021-06-14 DIAGNOSIS — R56.9 SEIZURE (HCC): Primary | ICD-10-CM

## 2021-06-14 LAB
ALBUMIN SERPL-MCNC: 4.3 G/DL (ref 3.5–5.2)
ALBUMIN/GLOB SERPL: 1.7 G/DL
ALP SERPL-CCNC: 56 U/L (ref 39–117)
ALT SERPL W P-5'-P-CCNC: 6 U/L (ref 1–33)
ANION GAP SERPL CALCULATED.3IONS-SCNC: 11 MMOL/L (ref 5–15)
AST SERPL-CCNC: 15 U/L (ref 1–32)
B-HCG UR QL: NEGATIVE
BASOPHILS # BLD AUTO: 0 10*3/MM3 (ref 0–0.2)
BASOPHILS NFR BLD AUTO: 0.6 % (ref 0–1.5)
BILIRUB SERPL-MCNC: 0.2 MG/DL (ref 0–1.2)
BILIRUB UR QL STRIP: NEGATIVE
BUN SERPL-MCNC: 8 MG/DL (ref 6–20)
BUN/CREAT SERPL: 11.8 (ref 7–25)
CALCIUM SPEC-SCNC: 9.1 MG/DL (ref 8.6–10.5)
CHLORIDE SERPL-SCNC: 103 MMOL/L (ref 98–107)
CLARITY UR: CLEAR
CO2 SERPL-SCNC: 24 MMOL/L (ref 22–29)
COLOR UR: YELLOW
CREAT SERPL-MCNC: 0.68 MG/DL (ref 0.57–1)
DEPRECATED RDW RBC AUTO: 40.3 FL (ref 37–54)
EOSINOPHIL # BLD AUTO: 0 10*3/MM3 (ref 0–0.4)
EOSINOPHIL NFR BLD AUTO: 0.4 % (ref 0.3–6.2)
ERYTHROCYTE [DISTWIDTH] IN BLOOD BY AUTOMATED COUNT: 12.9 % (ref 12.3–15.4)
GFR SERPL CREATININE-BSD FRML MDRD: 105 ML/MIN/1.73
GLOBULIN UR ELPH-MCNC: 2.5 GM/DL
GLUCOSE SERPL-MCNC: 104 MG/DL (ref 65–99)
GLUCOSE UR STRIP-MCNC: NEGATIVE MG/DL
HCT VFR BLD AUTO: 36 % (ref 34–46.6)
HGB BLD-MCNC: 12.4 G/DL (ref 12–15.9)
HGB UR QL STRIP.AUTO: NEGATIVE
KETONES UR QL STRIP: NEGATIVE
LEUKOCYTE ESTERASE UR QL STRIP.AUTO: NEGATIVE
LYMPHOCYTES # BLD AUTO: 1.1 10*3/MM3 (ref 0.7–3.1)
LYMPHOCYTES NFR BLD AUTO: 18.1 % (ref 19.6–45.3)
MCH RBC QN AUTO: 30.5 PG (ref 26.6–33)
MCHC RBC AUTO-ENTMCNC: 34.6 G/DL (ref 31.5–35.7)
MCV RBC AUTO: 88.2 FL (ref 79–97)
MONOCYTES # BLD AUTO: 0.3 10*3/MM3 (ref 0.1–0.9)
MONOCYTES NFR BLD AUTO: 5 % (ref 5–12)
NEUTROPHILS NFR BLD AUTO: 4.6 10*3/MM3 (ref 1.7–7)
NEUTROPHILS NFR BLD AUTO: 75.9 % (ref 42.7–76)
NITRITE UR QL STRIP: NEGATIVE
NRBC BLD AUTO-RTO: 0.1 /100 WBC (ref 0–0.2)
PH UR STRIP.AUTO: 6.5 [PH] (ref 5–8)
PLATELET # BLD AUTO: 270 10*3/MM3 (ref 140–450)
PMV BLD AUTO: 7.3 FL (ref 6–12)
POTASSIUM SERPL-SCNC: 4 MMOL/L (ref 3.5–5.2)
PROT SERPL-MCNC: 6.8 G/DL (ref 6–8.5)
PROT UR QL STRIP: NEGATIVE
RBC # BLD AUTO: 4.08 10*6/MM3 (ref 3.77–5.28)
SODIUM SERPL-SCNC: 138 MMOL/L (ref 136–145)
SP GR UR STRIP: 1.01 (ref 1–1.03)
UROBILINOGEN UR QL STRIP: NORMAL
WBC # BLD AUTO: 6.1 10*3/MM3 (ref 3.4–10.8)

## 2021-06-14 PROCEDURE — 80053 COMPREHEN METABOLIC PANEL: CPT | Performed by: NURSE PRACTITIONER

## 2021-06-14 PROCEDURE — 81025 URINE PREGNANCY TEST: CPT | Performed by: NURSE PRACTITIONER

## 2021-06-14 PROCEDURE — 99284 EMERGENCY DEPT VISIT MOD MDM: CPT

## 2021-06-14 PROCEDURE — 71045 X-RAY EXAM CHEST 1 VIEW: CPT

## 2021-06-14 PROCEDURE — 81003 URINALYSIS AUTO W/O SCOPE: CPT | Performed by: NURSE PRACTITIONER

## 2021-06-14 PROCEDURE — 25010000003 LEVETIRACETAM IN NACL 0.75% 1000 MG/100ML SOLUTION: Performed by: NURSE PRACTITIONER

## 2021-06-14 PROCEDURE — 85025 COMPLETE CBC W/AUTO DIFF WBC: CPT | Performed by: NURSE PRACTITIONER

## 2021-06-14 PROCEDURE — 96374 THER/PROPH/DIAG INJ IV PUSH: CPT

## 2021-06-14 RX ORDER — SODIUM CHLORIDE 0.9 % (FLUSH) 0.9 %
10 SYRINGE (ML) INJECTION AS NEEDED
Status: DISCONTINUED | OUTPATIENT
Start: 2021-06-14 | End: 2021-06-15 | Stop reason: HOSPADM

## 2021-06-14 RX ORDER — LEVETIRACETAM 10 MG/ML
1000 INJECTION INTRAVASCULAR ONCE
Status: COMPLETED | OUTPATIENT
Start: 2021-06-14 | End: 2021-06-14

## 2021-06-14 RX ADMIN — LEVETIRACETAM 1000 MG: 1000 INJECTION, SOLUTION INTRAVENOUS at 22:50

## 2021-06-15 ENCOUNTER — APPOINTMENT (OUTPATIENT)
Dept: CT IMAGING | Facility: HOSPITAL | Age: 26
End: 2021-06-15

## 2021-06-15 VITALS
WEIGHT: 167 LBS | HEIGHT: 66 IN | HEART RATE: 66 BPM | SYSTOLIC BLOOD PRESSURE: 102 MMHG | DIASTOLIC BLOOD PRESSURE: 62 MMHG | BODY MASS INDEX: 26.84 KG/M2 | RESPIRATION RATE: 15 BRPM | OXYGEN SATURATION: 97 % | TEMPERATURE: 98.1 F

## 2021-06-15 PROCEDURE — 96375 TX/PRO/DX INJ NEW DRUG ADDON: CPT

## 2021-06-15 PROCEDURE — 70450 CT HEAD/BRAIN W/O DYE: CPT

## 2021-06-15 RX ORDER — FAMOTIDINE 10 MG/ML
20 INJECTION, SOLUTION INTRAVENOUS ONCE
Status: COMPLETED | OUTPATIENT
Start: 2021-06-15 | End: 2021-06-15

## 2021-06-15 RX ORDER — ACETAMINOPHEN 500 MG
1000 TABLET ORAL ONCE
Status: COMPLETED | OUTPATIENT
Start: 2021-06-15 | End: 2021-06-15

## 2021-06-15 RX ADMIN — ACETAMINOPHEN 1000 MG: 500 TABLET, FILM COATED ORAL at 00:38

## 2021-06-15 RX ADMIN — FAMOTIDINE 20 MG: 10 INJECTION INTRAVENOUS at 00:38

## 2021-06-15 NOTE — DISCHARGE INSTRUCTIONS
Keep your appointment scheduled with your neurologist on the 21st  Continue follow-up your primary care provider  Take Keppra as prescribed  Return to the ED for new or worsening symptoms  No driving, tub baths, swimming, operating heavy machinery until cleared by your neurologist or primary care provider

## 2021-06-15 NOTE — ED PROVIDER NOTES
Subjective   Patient presents with:  Seizures    Lissette Borja MD  Patient's last menstrual period was 05/12/2021.  No Known Allergies  Onset: Just prior to arrival    Context: Patient is a 25-year-old female, with a history of absence seizures, as well as a history of tonic-clonic seizure in the past, presents emergency department after a tonic-clonic seizure tonight.  Patient reports that she had laid down about 930 to go to bed, and then remembers waking up to the ambulance, she is unsure what happened, but her boyfriend reported that she had a seizure in the bed.  Patient reports has not had a tonic-clonic seizure since about 2016 or 2017.  She does have her absent seizures frequently at the end of her period, and she is currently thin of her menstrual cycle.  She reports that sometimes she has her absent seizures 3-5 times per day.  Patient is supposed to be taking Keppra 500 mg p.o. twice daily, she says that she does take at least once per day, but is not always compliant with twice a day.  She also reports that she had been prescribed Lamictal from her neurologist in [facility], but she has been out of her medication, and has not had this for 3 weeks.  She has not had any recent trauma, she is not been feeling ill otherwise.  She reports she feels normal at this time.            Review of Systems   Constitutional: Negative for chills and fever.   Eyes: Negative for photophobia and visual disturbance.   Respiratory: Negative for cough, chest tightness and shortness of breath.    Cardiovascular: Negative for chest pain, palpitations and leg swelling.   Gastrointestinal: Negative for abdominal pain, diarrhea, nausea and vomiting.   Genitourinary: Negative for dysuria.   Musculoskeletal: Negative for back pain and neck pain.   Skin: Negative for color change and rash.   Neurological: Positive for seizures and headaches. Negative for dizziness, tremors, syncope, facial asymmetry, speech difficulty,  weakness, light-headedness and numbness.   Psychiatric/Behavioral: Negative for confusion.       Past Medical History:   Diagnosis Date   • Asthma    • Bilateral ovarian cysts    • Epilepsy (CMS/HCC)    • GERD (gastroesophageal reflux disease)    • Migraines    • Seizures (CMS/HCC)     absence. Sees a neurologist in [place] where she's from.       No Known Allergies    Past Surgical History:   Procedure Laterality Date   • CHOLECYSTECTOMY  2007       Family History   Problem Relation Age of Onset   • Heart disease Maternal Grandfather    • Heart disease Paternal Grandfather    • Diabetes Paternal Grandfather    • Hypertension Paternal Grandfather    • Hyperlipidemia Paternal Grandfather    • Migraines Paternal Grandfather    • Migraines Mother    • Obesity Father    • Breast cancer Maternal Grandmother    • Breast cancer Paternal Grandmother    • Migraines Paternal Grandmother        Social History     Socioeconomic History   • Marital status: Single     Spouse name: Not on file   • Number of children: Not on file   • Years of education: Not on file   • Highest education level: Not on file   Tobacco Use   • Smoking status: Never Smoker   • Smokeless tobacco: Never Used   Vaping Use   • Vaping Use: Never used   Substance and Sexual Activity   • Alcohol use: Yes     Comment: occasional   • Drug use: No   • Sexual activity: Defer     Birth control/protection: OCP           Objective   Physical Exam  Vitals and nursing note reviewed.   Constitutional:       General: She is not in acute distress.     Appearance: Normal appearance. She is not ill-appearing, toxic-appearing or diaphoretic.   HENT:      Head: Normocephalic and atraumatic.      Comments: No intraoral injury.     Nose: Nose normal.      Mouth/Throat:      Mouth: Mucous membranes are moist.      Pharynx: Oropharynx is clear.   Eyes:      Extraocular Movements: Extraocular movements intact.      Conjunctiva/sclera: Conjunctivae normal.      Pupils: Pupils are  "equal, round, and reactive to light.   Neck:      Trachea: Trachea and phonation normal.      Meningeal: Brudzinski's sign absent.   Cardiovascular:      Rate and Rhythm: Normal rate and regular rhythm.      Heart sounds: Normal heart sounds. No murmur heard.   No friction rub. No gallop.    Pulmonary:      Effort: Pulmonary effort is normal.      Breath sounds: Normal breath sounds.   Abdominal:      General: Bowel sounds are normal.      Palpations: Abdomen is soft.   Musculoskeletal:         General: Normal range of motion.      Cervical back: Full passive range of motion without pain, normal range of motion and neck supple. No rigidity.   Skin:     General: Skin is warm and dry.      Capillary Refill: Capillary refill takes less than 2 seconds.   Neurological:      General: No focal deficit present.      Mental Status: She is alert and oriented to person, place, and time.   Psychiatric:         Mood and Affect: Mood normal.         Behavior: Behavior normal.         Procedures           ED Course      /62   Pulse 66   Temp 98.4 °F (36.9 °C) (Oral)   Resp 15   Ht 167.6 cm (66\")   Wt 75.8 kg (167 lb)   LMP 05/12/2021   SpO2 97%   BMI 26.95 kg/m²   Labs Reviewed   COMPREHENSIVE METABOLIC PANEL - Abnormal; Notable for the following components:       Result Value    Glucose 104 (*)     All other components within normal limits    Narrative:     GFR Normal >60  Chronic Kidney Disease <60  Kidney Failure <15     CBC WITH AUTO DIFFERENTIAL - Abnormal; Notable for the following components:    Lymphocyte % 18.1 (*)     All other components within normal limits   URINALYSIS W/ MICROSCOPIC IF INDICATED (NO CULTURE) - Normal    Narrative:     Urine microscopic not indicated.   PREGNANCY, URINE - Normal   CBC AND DIFFERENTIAL    Narrative:     The following orders were created for panel order CBC & Differential.  Procedure                               Abnormality         Status                     ---------     "                           -----------         ------                     CBC Auto Differential[[id number]]        Abnormal            Final result                 Please view results for these tests on the individual orders.     Medications   sodium chloride 0.9 % flush 10 mL (has no administration in time range)   levETIRAcetam in NaCl 0.75% (KEPPRA) IVPB 1,000 mg (0 mg Intravenous Stopped 6/14/21 2317)   famotidine (PEPCID) injection 20 mg (20 mg Intravenous Given 6/15/21 0038)   acetaminophen (TYLENOL) tablet 1,000 mg (1,000 mg Oral Given 6/15/21 0038)     XR Chest 1 View    Result Date: 6/14/2021  No active disease.  Electronically Signed By-Javad Dye MD On:6/14/2021 11:01 PM This report was finalized on [id number] by  Javad Dye MD.         Appropriate PPE was worn during the duration of the care for this patient while in the emergency department per [facility]                                MDM  Number of Diagnoses or Management Options  Seizure (CMS/[facility])  Diagnosis management comments: ----Differentials: Infection, electrolyte abnormality, arrhythmia, subtherapeutic [place]  This list is not all inclusive and does not constitute the entireity of considered causes.       ----Lab and imaging reviewd by me, imaging interpreted per radiologist and independly viewed by me: hCG negative, UA negative, CBC nonconcerning, CMP nonconcerning, CT head reveals no acute findings      ED  Course----Patient was brought back to the emergency department room for evaluation and  placed on appropriate monitoring.  IV was established, blood work obtained.  Vital signs have  been reviewed. Patient is afebrile. Non toxic in appearance. Alert and oriented to person, place, time and situation. She was given dose of Keppra here in the ED. She is not had further seizure-like activity. Patient is no longer postictal. She is awake alert oriented, nonfocal neuro exam. I advised she follow-up with her neurologist as  scheduled on the 21st, and continue taking her Keppra as prescribed.    I spoke with the patient at the bedside regarding their plan of care, discharge instruction, home care, prescriptions, and importance follow-up.  We discussed test results at the bedside, including incidental abnormal labs, radiological findings, understands need for follow-up with primary care or specialist if indicated.      Patient was made aware of indications to return to the emergency department.  Patient agrees with the current plan of care for discharge, verbalized understanding of all instructions    Pt is aware that discharge does not mean that nothing is wrong but it indicates no emergency is present and they must continue care with follow-up as given below or physician of their choice                               Amount and/or Complexity of Data Reviewed  Clinical lab tests: reviewed  Tests in the radiology section of CPT®: reviewed    Patient Progress  Patient progress: stable      Final diagnoses:   Seizure (CMS/HCC)       ED Disposition  ED Disposition     ED Disposition Condition Comment    Discharge Stable           Vira Zarate, APRN  [address] [id number]  [phone]    Schedule an appointment as soon as possible for a visit            Medication List      No changes were made to your prescriptions during this visit.          Yaritza West, BENJI  06/15/21 0056       Yaritza West, BENJI  06/15/21 0057

## 2021-06-15 NOTE — ED NOTES
Patient has a history of absent seizures, states that she has increased seizures when she is due to start her period. Tonight her fiance reports that patient had a 45 second episode of rigid tonic-clonic seizure-like activity and was having difficulty breathing during it. EMS reports that patient was postictal upon their arrival, only oriented to self. A&Ox4 on ED arrival. Reports 6/10 headache that she says is common after her seizures.     Brittany Engle, LPN  06/14/21 [id number]

## 2022-10-31 ENCOUNTER — REFERRAL TRIAGE (OUTPATIENT)
Dept: LABOR AND DELIVERY | Facility: HOSPITAL | Age: 27
End: 2022-10-31

## 2022-12-27 ENCOUNTER — PATIENT OUTREACH (OUTPATIENT)
Dept: LABOR AND DELIVERY | Facility: HOSPITAL | Age: 27
End: 2022-12-27

## 2022-12-27 NOTE — OUTREACH NOTE
[facility]  Unable to Reach       Questions/Answers    Flowsheet Row Responses   Pending Outreach Confirm Patient Interest   Call Attempt First   Outcome Left message   Next Call Attempt Date 12/29/22          Contact information given     Hillary Mckinney RN  Maternity Nurse Navigator    12/27/2022, 15:12 EST

## 2022-12-29 ENCOUNTER — PATIENT OUTREACH (OUTPATIENT)
Dept: LABOR AND DELIVERY | Facility: HOSPITAL | Age: 27
End: 2022-12-29

## 2022-12-29 NOTE — OUTREACH NOTE
[facility]    Contact info provided, encouraged to call if she has any questions, concerns, or needs assistance with resources, verbalizes understanding.    Hillary Mckinney RN  Maternity Nurse Navigator    12/29/2022, 16:05 EST